# Patient Record
Sex: MALE | Race: WHITE | NOT HISPANIC OR LATINO | Employment: FULL TIME | URBAN - METROPOLITAN AREA
[De-identification: names, ages, dates, MRNs, and addresses within clinical notes are randomized per-mention and may not be internally consistent; named-entity substitution may affect disease eponyms.]

---

## 2018-10-15 ENCOUNTER — CLINICAL SUPPORT (OUTPATIENT)
Dept: INFECTIOUS DISEASES | Facility: CLINIC | Age: 48
End: 2018-10-15

## 2018-10-15 ENCOUNTER — OFFICE VISIT (OUTPATIENT)
Dept: INFECTIOUS DISEASES | Facility: CLINIC | Age: 48
End: 2018-10-15

## 2018-10-15 VITALS
HEIGHT: 71 IN | BODY MASS INDEX: 31.54 KG/M2 | TEMPERATURE: 99 F | HEART RATE: 81 BPM | DIASTOLIC BLOOD PRESSURE: 74 MMHG | WEIGHT: 225.31 LBS | SYSTOLIC BLOOD PRESSURE: 127 MMHG

## 2018-10-15 DIAGNOSIS — Z71.85 VACCINE COUNSELING: ICD-10-CM

## 2018-10-15 DIAGNOSIS — Z78.9 HISTORY OF FOREIGN TRAVEL: ICD-10-CM

## 2018-10-15 PROCEDURE — 90717 YELLOW FEVER VACCINE SUBQ: CPT | Mod: S$GLB,,, | Performed by: INTERNAL MEDICINE

## 2018-10-15 PROCEDURE — 90686 IIV4 VACC NO PRSV 0.5 ML IM: CPT | Mod: S$GLB,,, | Performed by: INTERNAL MEDICINE

## 2018-10-15 PROCEDURE — 90472 IMMUNIZATION ADMIN EACH ADD: CPT | Mod: S$GLB,,, | Performed by: INTERNAL MEDICINE

## 2018-10-15 PROCEDURE — 99999 PR PBB SHADOW E&M-EST. PATIENT-LVL I: CPT | Mod: PBBFAC,,,

## 2018-10-15 PROCEDURE — 90471 IMMUNIZATION ADMIN: CPT | Mod: S$GLB,,, | Performed by: INTERNAL MEDICINE

## 2018-10-15 PROCEDURE — 99999 PR PBB SHADOW E&M-NEW PATIENT-LVL III: CPT | Mod: PBBFAC,,, | Performed by: INTERNAL MEDICINE

## 2018-10-15 PROCEDURE — 99401 PREV MED CNSL INDIV APPRX 15: CPT | Mod: S$GLB,,, | Performed by: INTERNAL MEDICINE

## 2018-10-15 RX ORDER — BUPROPION HYDROCHLORIDE 300 MG/1
TABLET ORAL
COMMUNITY
Start: 2018-09-20

## 2018-10-15 RX ORDER — FLUOXETINE HYDROCHLORIDE 20 MG/1
CAPSULE ORAL
COMMUNITY
Start: 2018-09-20

## 2018-10-15 RX ORDER — DOXEPIN HYDROCHLORIDE 10 MG/1
CAPSULE ORAL
COMMUNITY
Start: 2018-09-20

## 2018-10-15 RX ORDER — BUPRENORPHINE HYDROCHLORIDE, NALOXONE HYDROCHLORIDE 8; 2 MG/1; MG/1
FILM, SOLUBLE BUCCAL; SUBLINGUAL
COMMUNITY
Start: 2018-10-06

## 2018-10-15 RX ORDER — AZITHROMYCIN 500 MG/1
TABLET, FILM COATED ORAL
Qty: 2 TABLET | Refills: 0 | Status: SHIPPED | OUTPATIENT
Start: 2018-10-15

## 2018-10-15 RX ORDER — DOXYCYCLINE HYCLATE 100 MG
TABLET ORAL
Qty: 45 TABLET | Refills: 0 | Status: SHIPPED | OUTPATIENT
Start: 2018-10-15

## 2018-10-15 NOTE — PROGRESS NOTES
INFECTIOUS DISEASE TRAVEL CLINIC  10/15/2018 1:14 PM    Subjective:      Chief Complaint:   Chief Complaint   Patient presents with    Immunizations    Travel Consult       History of Present Illness    Patient Jakob Souza is a 48 y.o. male who presents today for routine pretravel consultation.   The patient will be going [place] on 12/16 - 1/4. They report the cruise will go to [place], [place], [place], [place], [place] [facility] [place], [facility], [place], and [facility]. The purpose of this trip is leisure. The patient will be lodging on the cruise ship and reports all meals will be there as well. They expect to [place] several times to do a bus tour on land for several hours in several cities. They have no other plans to do travel independent from the cruise tour.     The patient denies prior travel history. he patient reports that they are up to date on all their childhood vaccinations.  Had small pox vaccine and reaction. The patient denies recent vaccinations.    Pmxh: denies    Meds: Suboxone, wellbutrin, prozac    A: nkda    Pshx: denies    Fhx: noncontributory    Sochx: h/o drug use, but none currently. Has tattos, but denies any home made ones.       Review of Symptoms:  Constitutional: Denies fevers, chills, or weakness.  ENT: Denies dysphagia, nasal discharge, ear pain or discharge.  Cardiovascular: Denies chest pain, palpitations, orthopnea, or claudication.  Respiratory: Denies shortness of breath, cough, hemoptysis, or wheezing.  GI: Denies nausea/vomitting, hematochezia, melena, abd pain, or changes in appetite.  : Denies dysuria, incontinence, or hematuria.  Musculoskeletal: Denies joint pain or myalgias.  Skin/breast: Denies rashes, lumps, lesions, or discharge.  Neurologic: Denies headache, dizziness, vertigo, or paresthesias.      Social History     Socioeconomic History    Marital status:      Spouse name: Not on file    Number of children: Not on file    Years of  education: Not on file    Highest education level: Not on file   Social Needs    Financial resource strain: Not on file    Food insecurity - worry: Not on file    Food insecurity - inability: Not on file    Transportation needs - medical: Not on file    Transportation needs - non-medical: Not on file   Occupational History    Not on file   Tobacco Use    Smoking status: Not on file   Substance and Sexual Activity    Alcohol use: Not on file    Drug use: Not on file    Sexual activity: Not on file   Other Topics Concern    Not on file   Social History Narrative    Not on file       Review of patient's allergies indicates:  Allergies not on file      Objective:   VS (24h):   Vitals:    10/15/18 1313   BP: 127/74   Pulse: 81   Temp: 98.9 °F (37.2 °C)     General: Afebrile, alert, comfortable, no acute distress.   HEENT: ARANZA. EOMI, no scleral icterus.   Pulmonary: Non labored  Extremities: Moves all extremities x 4.   Skin: No jaundice, rashes, or visible lesions. + tatoos  Neurological:  Alert and oriented x 4.     No results found for: GLU  No results found for: CALCIUM  No results found for: ALBUMIN  No results found for: PROT  No results found for: NA  No results found for: K  No results found for: CO2  No results found for: CL  No results found for: BUN  No results found for: CREATININE  No results found for: ALKPHOS  No results found for: ALT  No results found for: AST  No results found for: BILITOT  No results found for: WBC  No results found for: HGB  No results found for: HCT  No results found for: MCV  No results found for: PLT  No results found for: CHOL  No results found for: HDL  No results found for: LDLCALC  No results found for: TRIG  No results found for: CHOLHDL    HIV: No components found for: HIV 1/2 AG/AB  Hepatitis C IgG: No components found for: HEPATITIS C  Syphilis: No results found for: RPR    Hepatitis A IgG: No components found for: HEPATITIS A IGG  Hepatitis Bc IgG: No  components found for: HEPATITIS B CORE IGG  Hepatitis Bs IgG:  Quantiferon: No results found for: QUANTIFERON        Assessment:     Pre-Travel clinic assessment  Vaccine counseling    Plan:     Patient specific risks:      The patient was provided with an extensive travel guidance packet which provides travel information specific to the patients itinerary.     Registering with the state department and travel insurance was recommended in case of emergency.     Destination specific risks:      -Infectious Disease risks:      Mosquito Borne pathogens:  Reviewed basic mosquito avoidance precautions including wearing long sleeve clothing and insect repellant.  to prevent insect-borne diseases (e.g., malaria, dengue).The patient was also instructed to purchase insect repellent containing DEET (25-35%; higher strengths last longer, but >35% will damage synthetic materials) and apply premethrin spray on clothing according to repellent label instructions.    An anti-malarial agent was prescribed for malaria prophylaxis and possible side effects were reviewed. Doxycycline po qdaily, start 2 days before expected exposure and end 4 weeks after last day of exposure.    Food Borne pathogens:  Reviewed basic hand, food and water sanitation precautions.  Patient instructed to take hand  on their trip. The patient was instructed to purchase Imodium over the counter to take in case diarrhea (without blood or fever) develops. Azithromycin was ordered for treatment if severe or bloody diarrhea develops and the patient was instructed on use and possible side effects.    Blood Borne Pathogens:  Counseled against more tattoos    STDs:  Risk of STDs reviewed with the patient. Discussed Personal protective measures     Rabies: Discussed the risk of rabies and precautions to prevent exposure. Patient aware to seek prompt medical care should they be exposed.     -Environmental risks:     Personal and travel safety. Precautions to  minimize risk/exposure to crime and motor vehicle accidents were reviewed with the patient.      Precautions against sun exposure.    -Vaccinations:  The patient's immunization history was reviewed and, based on the patient's itinerary, the following immunizations were ordered:  - Influenza vaccine  - Yellow fever vaccine    Declined hepatitis A and typhoid vaccine. Denied offer to check other hepatitis serology    The patient was encouraged to contact us about any problems that may develop after immunization and possible side effects were reviewed.    Because of the nationwide shortage of Yellow Fever vaccine, the patient was offered Stamaril vaccine through the research protocol. Inclusion and exclusion criteria were reviewed with the patient and the form completed. Risks and benefits were discussed.   The consent was reviewed by me in detail with the patient and all questions were answered. The patient agrees to participate, and signed the consent. The patient was advised to return and or report any significant side effects related to the vaccine.    The patient was instructed to contact us if problems develop after travel.    Alma Wallace MD, MPH  Infectious Disease

## 2018-10-15 NOTE — PROGRESS NOTES
Patient received Stamaril yellow fever w/yellow card documentation and PF  Flu vaccines.  Tolerated well and left in NAD

## 2025-02-14 DIAGNOSIS — U07.1 COVID-19 VIRUS DETECTED: ICD-10-CM
